# Patient Record
Sex: MALE | Race: ASIAN | NOT HISPANIC OR LATINO
[De-identification: names, ages, dates, MRNs, and addresses within clinical notes are randomized per-mention and may not be internally consistent; named-entity substitution may affect disease eponyms.]

---

## 2023-03-06 PROBLEM — Z00.00 ENCOUNTER FOR PREVENTIVE HEALTH EXAMINATION: Status: ACTIVE | Noted: 2023-03-06

## 2023-04-04 ENCOUNTER — APPOINTMENT (OUTPATIENT)
Dept: SURGICAL ONCOLOGY | Facility: CLINIC | Age: 28
End: 2023-04-04
Payer: COMMERCIAL

## 2023-04-04 VITALS
SYSTOLIC BLOOD PRESSURE: 118 MMHG | HEART RATE: 76 BPM | WEIGHT: 143.3 LBS | DIASTOLIC BLOOD PRESSURE: 63 MMHG | HEIGHT: 67 IN | BODY MASS INDEX: 22.49 KG/M2

## 2023-04-04 PROCEDURE — 99205 OFFICE O/P NEW HI 60 MIN: CPT

## 2023-04-04 NOTE — HISTORY OF PRESENT ILLNESS
[de-identified] : Mr. MARINE CAMPOS is a 27 year old male who present today for initial consultation for an atypical melanocytic lesion of the left ear.\par He also had 2 Lucio's Nevi removed from his left shoulder and his mid back with negative margins.\par \par He had a melanoma in-0situ excised from his left anterior chest many years ago.\par \par He had no family hx. 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

## 2023-04-04 NOTE — ASSESSMENT
[FreeTextEntry1] : IMP: 27 year old male present with left antihelix melanocytic nevus \par \par \par PLAN: \par resection of the ear lesion with skin graft

## 2023-04-04 NOTE — CONSULT LETTER
[Dear  ___] : Dear  [unfilled], [Consult Letter:] : I had the pleasure of evaluating your patient, [unfilled]. [Please see my note below.] : Please see my note below. [Consult Closing:] : Thank you very much for allowing me to participate in the care of this patient.  If you have any questions, please do not hesitate to contact me. [Sincerely,] : Sincerely, [FreeTextEntry1] : I will keep you informed of the final pathology. [FreeTextEntry3] : Chace Rollins MD FACS\par Chief of Surgical Oncology\par \par

## 2023-04-04 NOTE — PROCEDURE
[FreeTextEntry1] : IMP:\par - atypical melanocytic lesion of the left ear\par -2 Lucio;s nevi which have been removed with negative margins

## 2023-04-04 NOTE — PHYSICAL EXAM
[Normal] : supple, no neck mass and thyroid not enlarged [Normal Neck Lymph Nodes] : normal neck lymph nodes  [Normal Supraclavicular Lymph Nodes] : normal supraclavicular lymph nodes [Normal] : oriented to person, place and time, with appropriate affect [de-identified] : 8 mm area of biopsy in the superior anti-helix of the left ear without any satellite lesions [de-identified] : Normal parotid nodes

## 2023-04-05 ENCOUNTER — RESULT REVIEW (OUTPATIENT)
Age: 28
End: 2023-04-05

## 2023-04-07 ENCOUNTER — OUTPATIENT (OUTPATIENT)
Dept: OUTPATIENT SERVICES | Facility: HOSPITAL | Age: 28
LOS: 1 days | End: 2023-04-07
Payer: COMMERCIAL

## 2023-04-07 DIAGNOSIS — D48.5 NEOPLASM OF UNCERTAIN BEHAVIOR OF SKIN: ICD-10-CM

## 2023-04-07 PROCEDURE — 88321 CONSLTJ&REPRT SLD PREP ELSWR: CPT

## 2023-04-12 LAB — SURGICAL PATHOLOGY STUDY: SIGNIFICANT CHANGE UP

## 2023-04-20 ENCOUNTER — NON-APPOINTMENT (OUTPATIENT)
Age: 28
End: 2023-04-20

## 2023-04-21 ENCOUNTER — APPOINTMENT (OUTPATIENT)
Dept: PLASTIC SURGERY | Facility: CLINIC | Age: 28
End: 2023-04-21
Payer: COMMERCIAL

## 2023-04-21 ENCOUNTER — TRANSCRIPTION ENCOUNTER (OUTPATIENT)
Age: 28
End: 2023-04-21

## 2023-04-21 VITALS
RESPIRATION RATE: 17 BRPM | HEIGHT: 67 IN | OXYGEN SATURATION: 97 % | WEIGHT: 143 LBS | HEART RATE: 81 BPM | TEMPERATURE: 97.9 F | DIASTOLIC BLOOD PRESSURE: 71 MMHG | SYSTOLIC BLOOD PRESSURE: 111 MMHG | BODY MASS INDEX: 22.44 KG/M2

## 2023-04-21 DIAGNOSIS — Z78.9 OTHER SPECIFIED HEALTH STATUS: ICD-10-CM

## 2023-04-21 PROCEDURE — XXXXX: CPT | Mod: 1L

## 2023-04-26 NOTE — REASON FOR VISIT
[Consultation] : a consultation visit [FreeTextEntry1] : Patient presents for consultation regarding an atypical melanocytic lesion of the left ear, at the request of Dr Chace Rollins. He states to noticed lesion while in a routine visit. Patient has a Hx of melanoma of his chest  in 2017 and Fhx of skin cancer in his paternal Grandmother and Grandfather. Patient denies any complaints of pain, bleeding, itching, drainage, and redness. Patient has Sx scheduled for 05/03/23.

## 2023-04-26 NOTE — HISTORY OF PRESENT ILLNESS
[FreeTextEntry1] : Pt presents here today to discuss closure after excision of atypical melanocytic lesion of the left ear.  Lesion was noticed during routine derm visit.  Pt has a hx of melanoma on his chest in 2017 and has a family hx of skin cancer.  Pt denies any pain, bleeding, itching, drainage or redness.

## 2023-04-26 NOTE — PHYSICAL EXAM
[NI] : Normal [de-identified] : +biopsy site lesion superior anti helix of the left ear.  No satellite lesions.  No erythema

## 2023-04-26 NOTE — REVIEW OF SYSTEMS
[Fever] : no fever [Chills] : no chills [Negative] : Cardiovascular [FreeTextEntry4] : +lesion left ear

## 2023-04-28 ENCOUNTER — OUTPATIENT (OUTPATIENT)
Dept: OUTPATIENT SERVICES | Facility: HOSPITAL | Age: 28
LOS: 1 days | End: 2023-04-28

## 2023-04-28 VITALS
HEIGHT: 67 IN | TEMPERATURE: 97 F | HEART RATE: 73 BPM | OXYGEN SATURATION: 98 % | WEIGHT: 145.95 LBS | SYSTOLIC BLOOD PRESSURE: 120 MMHG | DIASTOLIC BLOOD PRESSURE: 76 MMHG | RESPIRATION RATE: 16 BRPM

## 2023-04-28 DIAGNOSIS — C43.22 MALIGNANT MELANOMA OF LEFT EAR AND EXTERNAL AURICULAR CANAL: ICD-10-CM

## 2023-04-28 DIAGNOSIS — Z98.890 OTHER SPECIFIED POSTPROCEDURAL STATES: Chronic | ICD-10-CM

## 2023-04-28 LAB
ANION GAP SERPL CALC-SCNC: 12 MMOL/L — SIGNIFICANT CHANGE UP (ref 7–14)
BUN SERPL-MCNC: 9 MG/DL — SIGNIFICANT CHANGE UP (ref 7–23)
CALCIUM SERPL-MCNC: 9.7 MG/DL — SIGNIFICANT CHANGE UP (ref 8.4–10.5)
CHLORIDE SERPL-SCNC: 101 MMOL/L — SIGNIFICANT CHANGE UP (ref 98–107)
CO2 SERPL-SCNC: 26 MMOL/L — SIGNIFICANT CHANGE UP (ref 22–31)
CREAT SERPL-MCNC: 0.83 MG/DL — SIGNIFICANT CHANGE UP (ref 0.5–1.3)
EGFR: 122 ML/MIN/1.73M2 — SIGNIFICANT CHANGE UP
GLUCOSE SERPL-MCNC: 97 MG/DL — SIGNIFICANT CHANGE UP (ref 70–99)
HCT VFR BLD CALC: 44.6 % — SIGNIFICANT CHANGE UP (ref 39–50)
HGB BLD-MCNC: 14.4 G/DL — SIGNIFICANT CHANGE UP (ref 13–17)
MCHC RBC-ENTMCNC: 27 PG — SIGNIFICANT CHANGE UP (ref 27–34)
MCHC RBC-ENTMCNC: 32.3 GM/DL — SIGNIFICANT CHANGE UP (ref 32–36)
MCV RBC AUTO: 83.5 FL — SIGNIFICANT CHANGE UP (ref 80–100)
NRBC # BLD: 0 /100 WBCS — SIGNIFICANT CHANGE UP (ref 0–0)
NRBC # FLD: 0 K/UL — SIGNIFICANT CHANGE UP (ref 0–0)
PLATELET # BLD AUTO: 240 K/UL — SIGNIFICANT CHANGE UP (ref 150–400)
POTASSIUM SERPL-MCNC: 4.1 MMOL/L — SIGNIFICANT CHANGE UP (ref 3.5–5.3)
POTASSIUM SERPL-SCNC: 4.1 MMOL/L — SIGNIFICANT CHANGE UP (ref 3.5–5.3)
RBC # BLD: 5.34 M/UL — SIGNIFICANT CHANGE UP (ref 4.2–5.8)
RBC # FLD: 13.1 % — SIGNIFICANT CHANGE UP (ref 10.3–14.5)
SODIUM SERPL-SCNC: 139 MMOL/L — SIGNIFICANT CHANGE UP (ref 135–145)
WBC # BLD: 7.46 K/UL — SIGNIFICANT CHANGE UP (ref 3.8–10.5)
WBC # FLD AUTO: 7.46 K/UL — SIGNIFICANT CHANGE UP (ref 3.8–10.5)

## 2023-04-28 NOTE — H&P PST ADULT - ENMT COMMENTS
Pt denies any loose teeth or dentures. Wire on lower teeth Pt denies any loose teeth or dentures. Wire on inner lower teeth Mallampati II

## 2023-04-28 NOTE — H&P PST ADULT - HISTORY OF PRESENT ILLNESS
28 year old male with skin lesion on left ear, s/p biopsy which was abnormal. Pt presents today for presurgical evaluation for ... 28 year old male with skin lesion on left ear, s/p biopsy which was abnormal. Pt presents today for presurgical evaluation for Resection of Melanoma of Left Ear with Skin Graft, Left Ear Wound Closure with Skin Graft.

## 2023-04-28 NOTE — H&P PST ADULT - NSANTHOSAYNRD_GEN_A_CORE
No. RONN screening performed.  STOP BANG Legend: 0-2 = LOW Risk; 3-4 = INTERMEDIATE Risk; 5-8 = HIGH Risk

## 2023-05-02 ENCOUNTER — TRANSCRIPTION ENCOUNTER (OUTPATIENT)
Age: 28
End: 2023-05-02

## 2023-05-03 ENCOUNTER — APPOINTMENT (OUTPATIENT)
Dept: PLASTIC SURGERY | Facility: HOSPITAL | Age: 28
End: 2023-05-03
Payer: COMMERCIAL

## 2023-05-03 ENCOUNTER — OUTPATIENT (OUTPATIENT)
Dept: OUTPATIENT SERVICES | Facility: HOSPITAL | Age: 28
LOS: 1 days | Discharge: HOME | End: 2023-05-03
Payer: COMMERCIAL

## 2023-05-03 ENCOUNTER — RESULT REVIEW (OUTPATIENT)
Age: 28
End: 2023-05-03

## 2023-05-03 ENCOUNTER — APPOINTMENT (OUTPATIENT)
Dept: SURGICAL ONCOLOGY | Facility: AMBULATORY SURGERY CENTER | Age: 28
End: 2023-05-03

## 2023-05-03 ENCOUNTER — TRANSCRIPTION ENCOUNTER (OUTPATIENT)
Age: 28
End: 2023-05-03

## 2023-05-03 VITALS
OXYGEN SATURATION: 99 % | SYSTOLIC BLOOD PRESSURE: 115 MMHG | TEMPERATURE: 98 F | DIASTOLIC BLOOD PRESSURE: 67 MMHG | RESPIRATION RATE: 16 BRPM | HEART RATE: 69 BPM

## 2023-05-03 VITALS
WEIGHT: 145.95 LBS | HEIGHT: 67 IN | DIASTOLIC BLOOD PRESSURE: 71 MMHG | TEMPERATURE: 98 F | SYSTOLIC BLOOD PRESSURE: 106 MMHG | HEART RATE: 56 BPM | RESPIRATION RATE: 16 BRPM | OXYGEN SATURATION: 98 %

## 2023-05-03 DIAGNOSIS — Z98.890 OTHER SPECIFIED POSTPROCEDURAL STATES: Chronic | ICD-10-CM

## 2023-05-03 DIAGNOSIS — C43.22 MALIGNANT MELANOMA OF LEFT EAR AND EXTERNAL AURICULAR CANAL: ICD-10-CM

## 2023-05-03 PROCEDURE — 88342 IMHCHEM/IMCYTCHM 1ST ANTB: CPT | Mod: 26

## 2023-05-03 PROCEDURE — 21015 RESECT FACE/SCALP TUM < 2 CM: CPT

## 2023-05-03 PROCEDURE — 13100 CMPLX RPR TRUNK 1.1-2.5 CM: CPT | Mod: 59

## 2023-05-03 PROCEDURE — 15260 FTH/GFT FR N/E/E/L 20 SQCM/<: CPT

## 2023-05-03 PROCEDURE — 88305 TISSUE EXAM BY PATHOLOGIST: CPT | Mod: 26

## 2023-05-03 RX ORDER — FINASTERIDE 5 MG/1
1 TABLET, FILM COATED ORAL
Refills: 0 | DISCHARGE

## 2023-05-03 RX ORDER — ACETAMINOPHEN 500 MG
2 TABLET ORAL
Qty: 0 | Refills: 0 | DISCHARGE
Start: 2023-05-03

## 2023-05-03 RX ORDER — ACETAMINOPHEN 500 MG
650 TABLET ORAL ONCE
Refills: 0 | Status: DISCONTINUED | OUTPATIENT
Start: 2023-05-03 | End: 2023-05-17

## 2023-05-03 RX ORDER — FAMOTIDINE 10 MG/ML
1 INJECTION INTRAVENOUS
Refills: 0 | DISCHARGE

## 2023-05-03 NOTE — ASU DISCHARGE PLAN (ADULT/PEDIATRIC) - NS MD DC FALL RISK RISK
For information on Fall & Injury Prevention, visit: [email or web address] OR  [email or web address] OR  https://www.cdc.gov/steadi/patient.html

## 2023-05-03 NOTE — ASU DISCHARGE PLAN (ADULT/PEDIATRIC) - CARE PROVIDER_API CALL
Uziel Petty)  Plastic Surgery  [address] [place] [address], [address]  [place] [address]  Phone: [phone]  Fax: [phone]  Follow Up Time: 1 week   Uziel Petty)  Plastic Surgery  [address] [place] [address], [address]  [place] [address]  Phone: [phone]  Fax: [phone]  Follow Up Time: 1 week    Chace Rollins)  Surgery  [address] [facility], [place] [address]  Phone: [phone]  Fax: [phone]  Follow Up Time: 2 weeks

## 2023-05-03 NOTE — ASU DISCHARGE PLAN (ADULT/PEDIATRIC) - PROVIDER TOKENS
PROVIDER:[TOKEN:[[id number]],FOLLOWUP:[1 week]] PROVIDER:[TOKEN:[[id number]],FOLLOWUP:[1 week]],PROVIDER:[TOKEN:[[id number]],FOLLOWUP:[2 weeks]]

## 2023-05-03 NOTE — ASU DISCHARGE PLAN (ADULT/PEDIATRIC) - ASU DC SPECIAL INSTRUCTIONSFT
Keep L ear dry until follow-up visit with Dr. Petty. You may shower from the neck down starting tomorrow. Follow-up in 10 days.    Follow up with Dr. Rollins in 1-2 weeks. Keep L ear dry until follow-up visit with Dr. Petty. You may shower from the neck down starting tomorrow. Follow-up in 10 days. Tylenol/motrin for pain control as needed.    Follow up with Dr. Rollins in 1-2 weeks. Keep L ear dry until follow-up visit with Dr. Petty. You may shower from the neck down starting tomorrow. Follow-up in 10 days. Tylenol/motrin for pain control as needed.    Follow up with Dr. Rollins in 1-2 weeks.    next dose of tylenol 2p as needed may take motrin

## 2023-05-03 NOTE — BRIEF OPERATIVE NOTE - OPERATION/FINDINGS
L ear melanocytic lesio nexcisio by general surgery. Reconstruction with FTSG taken from L groin and bolstered to recipient site.

## 2023-05-04 PROBLEM — L65.9 NONSCARRING HAIR LOSS, UNSPECIFIED: Chronic | Status: ACTIVE | Noted: 2023-04-28

## 2023-05-04 PROBLEM — C43.9 MALIGNANT MELANOMA OF SKIN, UNSPECIFIED: Chronic | Status: ACTIVE | Noted: 2023-04-28

## 2023-05-10 ENCOUNTER — APPOINTMENT (OUTPATIENT)
Dept: PLASTIC SURGERY | Facility: CLINIC | Age: 28
End: 2023-05-10
Payer: COMMERCIAL

## 2023-05-10 VITALS
SYSTOLIC BLOOD PRESSURE: 114 MMHG | DIASTOLIC BLOOD PRESSURE: 76 MMHG | WEIGHT: 143 LBS | HEART RATE: 93 BPM | BODY MASS INDEX: 22.44 KG/M2 | TEMPERATURE: 98.4 F | HEIGHT: 67 IN | OXYGEN SATURATION: 97 %

## 2023-05-10 PROCEDURE — 99024 POSTOP FOLLOW-UP VISIT: CPT

## 2023-05-12 NOTE — REVIEW OF SYSTEMS
[Fever] : no fever [Chills] : no chills [Negative] : Respiratory [FreeTextEntry4] : Left ear bolster intact

## 2023-05-12 NOTE — HISTORY OF PRESENT ILLNESS
[FreeTextEntry1] : Pt s/p resection of left ear melanocytic nevus with skin graft closure.  Pt doing well no complaints.

## 2023-05-12 NOTE — PHYSICAL EXAM
[NI] : Normal [de-identified] : Left ear bolster intact no sign of infection no erythema no drainage.  Will maintain bolster

## 2023-05-12 NOTE — REASON FOR VISIT
[Post Op: _________] : a [unfilled] post op visit [FreeTextEntry1] : s/p resection of left ear melanocytic nevus with FTSG reconstruction DOS: 04/21/23. Patient states he is doing well and reports improving soreness of his left ear. He reports he had mild bleeding for the first 2 days post-op but states he has not noticed any bleeding or draining since. His bolster is in place and pt inquires when the bolster will be removed. Otherwise no concerns or complaints.

## 2023-05-16 ENCOUNTER — APPOINTMENT (OUTPATIENT)
Dept: SURGICAL ONCOLOGY | Facility: CLINIC | Age: 28
End: 2023-05-16
Payer: COMMERCIAL

## 2023-05-16 VITALS
WEIGHT: 145 LBS | HEART RATE: 90 BPM | DIASTOLIC BLOOD PRESSURE: 75 MMHG | HEIGHT: 67 IN | BODY MASS INDEX: 22.76 KG/M2 | SYSTOLIC BLOOD PRESSURE: 115 MMHG

## 2023-05-16 PROCEDURE — 99024 POSTOP FOLLOW-UP VISIT: CPT

## 2023-05-17 LAB — SURGICAL PATHOLOGY STUDY: SIGNIFICANT CHANGE UP

## 2023-05-18 ENCOUNTER — APPOINTMENT (OUTPATIENT)
Dept: PLASTIC SURGERY | Facility: CLINIC | Age: 28
End: 2023-05-18
Payer: COMMERCIAL

## 2023-05-18 VITALS
RESPIRATION RATE: 17 BRPM | SYSTOLIC BLOOD PRESSURE: 136 MMHG | BODY MASS INDEX: 22.76 KG/M2 | HEIGHT: 67 IN | HEART RATE: 89 BPM | OXYGEN SATURATION: 98 % | WEIGHT: 145 LBS | DIASTOLIC BLOOD PRESSURE: 83 MMHG

## 2023-05-18 PROCEDURE — 99024 POSTOP FOLLOW-UP VISIT: CPT

## 2023-05-18 NOTE — CONSULT LETTER
[Courtesy Letter:] : I had the pleasure of seeing your patient, [unfilled], in my office today. [Please see my note below.] : Please see my note below. [Consult Closing:] : Thank you very much for allowing me to participate in the care of this patient.  If you have any questions, please do not hesitate to contact me. [Sincerely,] : Sincerely, [FreeTextEntry1] : The area is healing nicely. he will continue to follow-up with you for total body screening. [FreeTextEntry3] : \par Chace Rollins MD FACS\par Chief of Surgical Oncology\par \par \par

## 2023-05-18 NOTE — ASSESSMENT
[FreeTextEntry1] : IMP: 27 year old male present with left antihelix melanocytic nevus \par S/P excision; pathology: no residual of the atypia, margins negative\par \par \par PLAN: \par RTO 3 months

## 2023-05-18 NOTE — HISTORY OF PRESENT ILLNESS
[de-identified] : Mr. MARINE CAMPOS is a 28 year old man here for a post-op visit \par \par He also had 2 Lucio's Nevi removed from his left shoulder and his mid back with negative margins.\par \par He had a melanoma in-situ excised from his left anterior chest many years ago.\par He had no family hx. 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 \par \par left ear melanocytic nevus excision w/ reconstruction on 5/3/2023 \par pathology : no residual of the atypia, margins negative

## 2023-05-18 NOTE — REASON FOR VISIT
[Post-Op] : a post-op for [FreeTextEntry2] : left ear melanocytic nevus excision w/ reconstruction on 5/3/2023

## 2023-05-18 NOTE — PHYSICAL EXAM
[Normal] : supple, no neck mass and thyroid not enlarged [Normal Neck Lymph Nodes] : normal neck lymph nodes  [Normal Supraclavicular Lymph Nodes] : normal supraclavicular lymph nodes [Normal] : oriented to person, place and time, with appropriate affect [de-identified] : left ear dressing in place [de-identified] : Normal parotid nodes

## 2023-05-22 NOTE — REASON FOR VISIT
[Post Op: _________] : a [unfilled] post op visit [FreeTextEntry1] : s/p resection of left ear melanocytic nevus with FTSG reconstruction DOS: 04/21/23. Patient  reports mild pain. Otherwise, no other complaints or concerns.

## 2023-05-22 NOTE — PHYSICAL EXAM
[NI] : Normal [de-identified] : Left ear bolster intact no sign of infection no erythema no drainage.  Bolster removed.  Skin graft viable healing well no sign of infection

## 2023-05-30 ENCOUNTER — APPOINTMENT (OUTPATIENT)
Dept: PLASTIC SURGERY | Facility: CLINIC | Age: 28
End: 2023-05-30
Payer: COMMERCIAL

## 2023-05-30 VITALS
SYSTOLIC BLOOD PRESSURE: 108 MMHG | OXYGEN SATURATION: 97 % | TEMPERATURE: 98.7 F | HEIGHT: 67 IN | HEART RATE: 75 BPM | DIASTOLIC BLOOD PRESSURE: 73 MMHG | WEIGHT: 145 LBS | BODY MASS INDEX: 22.76 KG/M2

## 2023-05-30 DIAGNOSIS — D22.9 MELANOCYTIC NEVI, UNSPECIFIED: ICD-10-CM

## 2023-05-30 PROCEDURE — 99024 POSTOP FOLLOW-UP VISIT: CPT

## 2023-06-06 PROBLEM — D22.9 MELANOCYTIC NEVUS: Status: ACTIVE | Noted: 2023-03-28

## 2023-06-06 NOTE — REASON FOR VISIT
[Post Op: _________] : a [unfilled] post op visit [FreeTextEntry1] : s/p resection of left ear melanocytic nevus with FTSG reconstruction DOS: 04/21/23. Patient states he is doing well and reports he has been using bacitracin and covering the skin graft with Telfa as directed. He denies pain, itching, draining, or bleeding. Otherwise no concerns or complaints.

## 2023-06-06 NOTE — REVIEW OF SYSTEMS
[Fever] : no fever [Chills] : no chills [Negative] : Respiratory [FreeTextEntry4] : Left ear dressing in place

## 2023-08-22 ENCOUNTER — APPOINTMENT (OUTPATIENT)
Dept: SURGICAL ONCOLOGY | Facility: CLINIC | Age: 28
End: 2023-08-22
Payer: COMMERCIAL

## 2023-08-22 VITALS
OXYGEN SATURATION: 97 % | RESPIRATION RATE: 17 BRPM | HEIGHT: 67 IN | DIASTOLIC BLOOD PRESSURE: 70 MMHG | SYSTOLIC BLOOD PRESSURE: 108 MMHG | HEART RATE: 58 BPM | BODY MASS INDEX: 23.54 KG/M2 | WEIGHT: 150 LBS

## 2023-08-22 DIAGNOSIS — D03.9 MELANOMA IN SITU, UNSPECIFIED: ICD-10-CM

## 2023-08-22 PROCEDURE — 99214 OFFICE O/P EST MOD 30 MIN: CPT

## 2023-08-22 NOTE — HISTORY OF PRESENT ILLNESS
[de-identified] : Mr. MARINE CAMPOS is a 28 year old man here for a post-op visit   He also had 2 Lucio's Nevi removed from his left shoulder and his mid back with negative margins.  He had a melanoma in-situ excised from his left anterior chest many years ago. He had no family hx. of melanoma.  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   left ear melanocytic nevus excision w/ reconstruction on 5/3/2023  pathology : no residual of the atypia, margins negative

## 2023-08-22 NOTE — PHYSICAL EXAM
[Normal] : supple, no neck mass and thyroid not enlarged [Normal Neck Lymph Nodes] : normal neck lymph nodes  [Normal Supraclavicular Lymph Nodes] : normal supraclavicular lymph nodes [Normal] : oriented to person, place and time, with appropriate affect [de-identified] : left ear skin graft in anti-helix healed nicely but dark [de-identified] : Normal parotid nodes

## 2023-08-22 NOTE — ASSESSMENT
[FreeTextEntry1] : IMP: 28 year old male present with left antihelix melanocytic nevus  S/P excision; pathology: no residual of the atypia, margins negative   PLAN: RTO PRN Dermatology follow-up q 6 months
